# Patient Record
Sex: FEMALE | Race: BLACK OR AFRICAN AMERICAN | NOT HISPANIC OR LATINO | URBAN - METROPOLITAN AREA
[De-identification: names, ages, dates, MRNs, and addresses within clinical notes are randomized per-mention and may not be internally consistent; named-entity substitution may affect disease eponyms.]

---

## 2017-07-20 ENCOUNTER — INPATIENT (INPATIENT)
Facility: HOSPITAL | Age: 22
LOS: 2 days | Discharge: HOME | End: 2017-07-23
Attending: OBSTETRICS & GYNECOLOGY | Admitting: OBSTETRICS & GYNECOLOGY
Payer: COMMERCIAL

## 2017-07-20 VITALS — WEIGHT: 198.42 LBS | HEIGHT: 67 IN

## 2017-07-20 DIAGNOSIS — O26.893 OTHER SPECIFIED PREGNANCY RELATED CONDITIONS, THIRD TRIMESTER: ICD-10-CM

## 2017-07-20 LAB
AMPHET UR-MCNC: NEGATIVE — SIGNIFICANT CHANGE UP
APPEARANCE UR: ABNORMAL
BACTERIA # UR AUTO: ABNORMAL
BARBITURATES UR SCN-MCNC: NEGATIVE — SIGNIFICANT CHANGE UP
BASOPHILS # BLD AUTO: 0 K/UL — SIGNIFICANT CHANGE UP (ref 0–0.2)
BASOPHILS NFR BLD AUTO: 0.1 % — SIGNIFICANT CHANGE UP (ref 0–2)
BENZODIAZ UR-MCNC: NEGATIVE — SIGNIFICANT CHANGE UP
BILIRUB UR-MCNC: NEGATIVE — SIGNIFICANT CHANGE UP
BLD GP AB SCN SERPL QL: SIGNIFICANT CHANGE UP
COCAINE METAB.OTHER UR-MCNC: NEGATIVE — SIGNIFICANT CHANGE UP
COD CRY URNS QL: ABNORMAL
COLOR SPEC: YELLOW — SIGNIFICANT CHANGE UP
COMMENT - URINE: SIGNIFICANT CHANGE UP
DIFF PNL FLD: NEGATIVE — SIGNIFICANT CHANGE UP
EOSINOPHIL # BLD AUTO: 0.1 K/UL — SIGNIFICANT CHANGE UP (ref 0–0.5)
EOSINOPHIL NFR BLD AUTO: 1.8 % — SIGNIFICANT CHANGE UP (ref 0–6)
EPI CELLS # UR: SIGNIFICANT CHANGE UP
GLUCOSE UR QL: NEGATIVE MG/DL — SIGNIFICANT CHANGE UP
HCT VFR BLD CALC: 34.1 % — LOW (ref 37–47)
HGB BLD-MCNC: 10.7 G/DL — LOW (ref 12–16)
KETONES UR-MCNC: NEGATIVE — SIGNIFICANT CHANGE UP
LEUKOCYTE ESTERASE UR-ACNC: NEGATIVE — SIGNIFICANT CHANGE UP
LYMPHOCYTES # BLD AUTO: 1.9 K/UL — SIGNIFICANT CHANGE UP (ref 1–4.8)
LYMPHOCYTES # BLD AUTO: 24.3 % — SIGNIFICANT CHANGE UP (ref 20–55)
MCHC RBC-ENTMCNC: 26 PG — LOW (ref 27–31)
MCHC RBC-ENTMCNC: 31.4 G/DL — LOW (ref 32–36)
MCV RBC AUTO: 82.8 FL — SIGNIFICANT CHANGE UP (ref 81–99)
METHADONE UR-MCNC: NEGATIVE — SIGNIFICANT CHANGE UP
MONOCYTES # BLD AUTO: 0.9 K/UL — HIGH (ref 0–0.8)
MONOCYTES NFR BLD AUTO: 11.1 % — HIGH (ref 3–10)
NEUTROPHILS # BLD AUTO: 4.8 K/UL — SIGNIFICANT CHANGE UP (ref 1.8–8)
NEUTROPHILS NFR BLD AUTO: 61.9 % — SIGNIFICANT CHANGE UP (ref 37–73)
NITRITE UR-MCNC: NEGATIVE — SIGNIFICANT CHANGE UP
OPIATES UR-MCNC: NEGATIVE — SIGNIFICANT CHANGE UP
PCP SPEC-MCNC: SIGNIFICANT CHANGE UP
PCP UR-MCNC: NEGATIVE — SIGNIFICANT CHANGE UP
PH UR: 7 — SIGNIFICANT CHANGE UP (ref 5–8)
PLATELET # BLD AUTO: 172 K/UL — SIGNIFICANT CHANGE UP (ref 150–400)
PROT UR-MCNC: NEGATIVE MG/DL — SIGNIFICANT CHANGE UP
RBC # BLD: 4.12 M/UL — LOW (ref 4.4–5.2)
RBC # FLD: 16.7 % — HIGH (ref 11–15.6)
RBC CASTS # UR COMP ASSIST: SIGNIFICANT CHANGE UP /HPF (ref 0–4)
SP GR SPEC: 1.01 — SIGNIFICANT CHANGE UP (ref 1.01–1.02)
THC UR QL: NEGATIVE — SIGNIFICANT CHANGE UP
TYPE + AB SCN PNL BLD: SIGNIFICANT CHANGE UP
UROBILINOGEN FLD QL: NEGATIVE MG/DL — SIGNIFICANT CHANGE UP
WBC # BLD: 7.8 K/UL — SIGNIFICANT CHANGE UP (ref 4.8–10.8)
WBC # FLD AUTO: 7.8 K/UL — SIGNIFICANT CHANGE UP (ref 4.8–10.8)
WBC UR QL: SIGNIFICANT CHANGE UP

## 2017-07-20 PROCEDURE — 76815 OB US LIMITED FETUS(S): CPT | Mod: 26

## 2017-07-20 PROCEDURE — 76818 FETAL BIOPHYS PROFILE W/NST: CPT | Mod: 26

## 2017-07-20 RX ORDER — SODIUM CHLORIDE 9 MG/ML
1000 INJECTION, SOLUTION INTRAVENOUS ONCE
Qty: 0 | Refills: 0 | Status: COMPLETED | OUTPATIENT
Start: 2017-07-20 | End: 2017-07-20

## 2017-07-20 RX ORDER — SODIUM CHLORIDE 9 MG/ML
1000 INJECTION, SOLUTION INTRAVENOUS
Qty: 0 | Refills: 0 | Status: DISCONTINUED | OUTPATIENT
Start: 2017-07-20 | End: 2017-07-21

## 2017-07-20 RX ORDER — OXYTOCIN 10 UNIT/ML
333.33 VIAL (ML) INJECTION
Qty: 20 | Refills: 0 | Status: COMPLETED | OUTPATIENT
Start: 2017-07-20

## 2017-07-20 RX ORDER — CITRIC ACID/SODIUM CITRATE 300-500 MG
30 SOLUTION, ORAL ORAL ONCE
Qty: 0 | Refills: 0 | Status: COMPLETED | OUTPATIENT
Start: 2017-07-20 | End: 2017-07-21

## 2017-07-20 RX ADMIN — SODIUM CHLORIDE 2000 MILLILITER(S): 9 INJECTION, SOLUTION INTRAVENOUS at 15:25

## 2017-07-20 RX ADMIN — SODIUM CHLORIDE 125 MILLILITER(S): 9 INJECTION, SOLUTION INTRAVENOUS at 15:25

## 2017-07-20 NOTE — PATIENT PROFILE OB - REFERRALS COMMENT, PROFILE
Patient has been seen post delivery by social work, patient is currently under investigation by CPS for neglect and has an open CPS case.  Social work is notifying CPS that patient has delivered.

## 2017-07-21 LAB
GAS PNL BLDCOV: 7.36 — SIGNIFICANT CHANGE UP (ref 7.11–7.36)
PCO2 BLDCOA: 50 MMHG — SIGNIFICANT CHANGE UP (ref 32.2–65.8)
PCO2 BLDCOV: 41.2 MMHG — SIGNIFICANT CHANGE UP (ref 27–49.4)
PH BLDCOA: 7.28 — SIGNIFICANT CHANGE UP (ref 7.11–7.36)
PO2 BLDCOA: 22.5 MMHG — SIGNIFICANT CHANGE UP (ref 6–30)
PO2 BLDCOA: 27.8 MMHG — SIGNIFICANT CHANGE UP (ref 17.4–41)
RPR SERPL-ACNC: SIGNIFICANT CHANGE UP
SAO2 % BLDCOA: SIGNIFICANT CHANGE UP
SAO2 % BLDCOV: SIGNIFICANT CHANGE UP

## 2017-07-21 RX ORDER — TETANUS TOXOID, REDUCED DIPHTHERIA TOXOID AND ACELLULAR PERTUSSIS VACCINE, ADSORBED 5; 2.5; 8; 8; 2.5 [IU]/.5ML; [IU]/.5ML; UG/.5ML; UG/.5ML; UG/.5ML
0.5 SUSPENSION INTRAMUSCULAR ONCE
Qty: 0 | Refills: 0 | Status: DISCONTINUED | OUTPATIENT
Start: 2017-07-21 | End: 2017-07-23

## 2017-07-21 RX ORDER — HYDROCORTISONE 1 %
1 OINTMENT (GRAM) TOPICAL EVERY 4 HOURS
Qty: 0 | Refills: 0 | Status: DISCONTINUED | OUTPATIENT
Start: 2017-07-21 | End: 2017-07-23

## 2017-07-21 RX ORDER — SODIUM CHLORIDE 9 MG/ML
3 INJECTION INTRAMUSCULAR; INTRAVENOUS; SUBCUTANEOUS EVERY 8 HOURS
Qty: 0 | Refills: 0 | Status: DISCONTINUED | OUTPATIENT
Start: 2017-07-21 | End: 2017-07-23

## 2017-07-21 RX ORDER — ACETAMINOPHEN 500 MG
650 TABLET ORAL EVERY 6 HOURS
Qty: 0 | Refills: 0 | Status: DISCONTINUED | OUTPATIENT
Start: 2017-07-21 | End: 2017-07-23

## 2017-07-21 RX ORDER — DOCUSATE SODIUM 100 MG
100 CAPSULE ORAL
Qty: 0 | Refills: 0 | Status: DISCONTINUED | OUTPATIENT
Start: 2017-07-21 | End: 2017-07-23

## 2017-07-21 RX ORDER — DIBUCAINE 1 %
1 OINTMENT (GRAM) RECTAL EVERY 4 HOURS
Qty: 0 | Refills: 0 | Status: DISCONTINUED | OUTPATIENT
Start: 2017-07-21 | End: 2017-07-23

## 2017-07-21 RX ORDER — OXYTOCIN 10 UNIT/ML
41.67 VIAL (ML) INJECTION
Qty: 20 | Refills: 0 | Status: DISCONTINUED | OUTPATIENT
Start: 2017-07-21 | End: 2017-07-23

## 2017-07-21 RX ORDER — OXYTOCIN 10 UNIT/ML
333.33 VIAL (ML) INJECTION
Qty: 20 | Refills: 0 | Status: DISCONTINUED | OUTPATIENT
Start: 2017-07-21 | End: 2017-07-23

## 2017-07-21 RX ORDER — IBUPROFEN 200 MG
600 TABLET ORAL EVERY 6 HOURS
Qty: 0 | Refills: 0 | Status: DISCONTINUED | OUTPATIENT
Start: 2017-07-21 | End: 2017-07-23

## 2017-07-21 RX ORDER — LANOLIN
1 OINTMENT (GRAM) TOPICAL EVERY 6 HOURS
Qty: 0 | Refills: 0 | Status: DISCONTINUED | OUTPATIENT
Start: 2017-07-21 | End: 2017-07-23

## 2017-07-21 RX ORDER — AER TRAVELER 0.5 G/1
1 SOLUTION RECTAL; TOPICAL EVERY 4 HOURS
Qty: 0 | Refills: 0 | Status: DISCONTINUED | OUTPATIENT
Start: 2017-07-21 | End: 2017-07-23

## 2017-07-21 RX ORDER — SIMETHICONE 80 MG/1
80 TABLET, CHEWABLE ORAL EVERY 6 HOURS
Qty: 0 | Refills: 0 | Status: DISCONTINUED | OUTPATIENT
Start: 2017-07-21 | End: 2017-07-23

## 2017-07-21 RX ORDER — GLYCERIN ADULT
1 SUPPOSITORY, RECTAL RECTAL AT BEDTIME
Qty: 0 | Refills: 0 | Status: DISCONTINUED | OUTPATIENT
Start: 2017-07-21 | End: 2017-07-23

## 2017-07-21 RX ORDER — PRAMOXINE HYDROCHLORIDE 150 MG/15G
1 AEROSOL, FOAM RECTAL EVERY 4 HOURS
Qty: 0 | Refills: 0 | Status: DISCONTINUED | OUTPATIENT
Start: 2017-07-21 | End: 2017-07-23

## 2017-07-21 RX ORDER — MAGNESIUM HYDROXIDE 400 MG/1
30 TABLET, CHEWABLE ORAL
Qty: 0 | Refills: 0 | Status: DISCONTINUED | OUTPATIENT
Start: 2017-07-21 | End: 2017-07-23

## 2017-07-21 RX ORDER — OXYCODONE AND ACETAMINOPHEN 5; 325 MG/1; MG/1
2 TABLET ORAL EVERY 6 HOURS
Qty: 0 | Refills: 0 | Status: DISCONTINUED | OUTPATIENT
Start: 2017-07-21 | End: 2017-07-23

## 2017-07-21 RX ORDER — DIPHENHYDRAMINE HCL 50 MG
25 CAPSULE ORAL EVERY 6 HOURS
Qty: 0 | Refills: 0 | Status: DISCONTINUED | OUTPATIENT
Start: 2017-07-21 | End: 2017-07-23

## 2017-07-21 RX ADMIN — Medication 1000 MILLIUNIT(S)/MIN: at 09:52

## 2017-07-21 RX ADMIN — Medication 30 MILLILITER(S): at 06:30

## 2017-07-21 RX ADMIN — SODIUM CHLORIDE 125 MILLILITER(S): 9 INJECTION, SOLUTION INTRAVENOUS at 06:29

## 2017-07-22 RX ADMIN — Medication 1 TABLET(S): at 19:12

## 2017-07-22 RX ADMIN — Medication 600 MILLIGRAM(S): at 01:39

## 2017-07-22 RX ADMIN — Medication 600 MILLIGRAM(S): at 02:29

## 2017-07-22 NOTE — PROGRESS NOTE ADULT - ASSESSMENT
21y years old.  status post spontaneous vaginal delivery at 41 weeks gestation. Postpartum day # 1 without complications

## 2017-07-22 NOTE — PROGRESS NOTE ADULT - SUBJECTIVE AND OBJECTIVE BOX
Postpartum progress note.  21y years old.  status post spontaneous vaginal delivery at 41 weeks gestation. Postpartum day # 1    Patient seen and examined at bedside, no acute overnight events.    Subjective: Patient is ambulating, tolerating PO intake of regular diet, voiding, passing flatus, ____ bowel movements. No breast tenderness. _______ feeding. Pain is well controlled. She reports ________ vaginal bleeding, has changed pads ___ times during the night _______ soaked. Patient denies headache, nausea/vomiting, shortness of breath, fever, chills or calf pain.     Objective:   Vital Signs: Vital Signs Last 24 Hrs  T(C): 36.7 (2017 21:09), Max: 37 (2017 13:56)  T(F): 98 (2017 21:09), Max: 98.6 (2017 13:56)  HR: 69 (2017 21:09) (54 - 69)  BP: 118/76 (2017 21:09) (99/46 - 129/60)  BP(mean): --  RR: 18 (2017 21:09) (16 - 20)  SpO2: --    Physical Examination:  General: No acute distress.  Lungs: Clear to auscultation bilaterally, no adventitious sounds.  Cardiovascular: Regular rate and rhythm, normal S1/S2, no murmurs.  Abdomen: Bowel sounds present, soft, non distended, minimally tender, fundus firm at ____ cm below umbilicus.  Pelvic: Minimal lochia rubra.  Extremities: No edema. No calf tenderness, (-) Dennis's sign.    Labs:                        10.7   7.8   )-----------( 172      ( 2017 15:41 )             34.1   Urinalysis Basic - ( 2017 15:45 )    Color: Yellow / Appearance: x / S.015 / pH: x  Gluc: x / Ketone: Negative  / Bili: Negative / Urobili: Negative mg/dL   Blood: x / Protein: Negative mg/dL / Nitrite: Negative   Leuk Esterase: Negative / RBC: 0-2 /HPF / WBC 0-2   Sq Epi: x / Non Sq Epi: Few / Bacteria: Few        Medication:  MEDICATIONS  (STANDING):  sodium chloride 0.9% lock flush 3 milliLiter(s) IV Push every 8 hours  diphtheria/tetanus/pertussis (acellular) Vaccine (ADAcel) 0.5 milliLiter(s) IntraMuscular once  oxytocin Infusion 41.667 milliUNIT(s)/Min (125 mL/Hr) IV Continuous <Continuous>  prenatal multivitamin 1 Tablet(s) Oral daily  oxytocin Infusion 333.333 milliUNIT(s)/Min (1000 mL/Hr) IV Continuous <Continuous>    MEDICATIONS  (PRN):  acetaminophen   Tablet. 650 milliGRAM(s) Oral every 6 hours PRN Mild Pain  acetaminophen   Tablet 650 milliGRAM(s) Oral every 6 hours PRN For Temp greater than 38.5 C (101.3 F)  ibuprofen  Tablet 600 milliGRAM(s) Oral every 6 hours PRN Moderate Pain  oxyCODONE    5 mG/acetaminophen 325 mG 2 Tablet(s) Oral every 6 hours PRN Severe Pain  hydrocortisone 1% Cream 1 Application(s) Topical every 4 hours PRN Moderate to Severe Perineal Pain  pramoxine 1%/zinc 5% Cream 1 Application(s) Topical every 4 hours PRN Moderate to Severe Perineal Pain  dibucaine 1% Ointment 1 Application(s) Topical every 4 hours PRN Perineal Discomfort  lanolin Ointment 1 Application(s) Topical every 6 hours PRN Sore Nipples  witch hazel Pads 1 Application(s) Topical every 4 hours PRN Perineal Discomfort  simethicone 80 milliGRAM(s) Chew every 6 hours PRN Gas  diphenhydrAMINE   Capsule 25 milliGRAM(s) Oral every 6 hours PRN Itching  glycerin Suppository - Adult 1 Suppository(s) Rectal at bedtime PRN Constipation  magnesium hydroxide Suspension 30 milliLiter(s) Oral two times a day PRN Constipation  docusate sodium 100 milliGRAM(s) Oral two times a day PRN Stool Softening Postpartum progress note.  21y years old.  status post spontaneous vaginal delivery at 41 weeks gestation. Postpartum day # 1    Patient seen and examined at bedside, no acute overnight events.    Subjective: Patient is ambulating, tolerating PO intake of regular diet, voiding, passing flatus, and defecating. No breast tenderness. Breast feeding without difficulty. Pain is well controlled. She reports minimal vaginal bleeding, has changed pads 3 times during the night partially soaked. Patient denies headache, nausea/vomiting, shortness of breath, fever, chills or calf pain.     Objective:   Vital Signs: Vital Signs Last 24 Hrs  T(C): 36.7 (2017 21:09), Max: 37 (2017 13:56)  T(F): 98 (2017 21:09), Max: 98.6 (2017 13:56)  HR: 69 (2017 21:09) (54 - 69)  BP: 118/76 (2017 21:09) (99/46 - 129/60)  BP(mean): --  RR: 18 (2017 21:09) (16 - 20)  SpO2: --    Physical Examination:  General: No acute distress.  Lungs: Clear to auscultation bilaterally, no adventitious sounds.  Cardiovascular: Regular rate and rhythm, normal S1/S2, no murmurs.  Abdomen: Bowel sounds present, soft, non distended, minimally tender, fundus firm at level of umbilicus.  Pelvic: Minimal lochia rubra.  Extremities: No edema. No calf tenderness, (-) Dennis's sign.    Labs:                        10.7   7.8   )-----------( 172      ( 2017 15:41 )             34.1   Urinalysis Basic - ( 2017 15:45 )    Color: Yellow / Appearance: x / S.015 / pH: x  Gluc: x / Ketone: Negative  / Bili: Negative / Urobili: Negative mg/dL   Blood: x / Protein: Negative mg/dL / Nitrite: Negative   Leuk Esterase: Negative / RBC: 0-2 /HPF / WBC 0-2   Sq Epi: x / Non Sq Epi: Few / Bacteria: Few        Medication:  MEDICATIONS  (STANDING):  sodium chloride 0.9% lock flush 3 milliLiter(s) IV Push every 8 hours  diphtheria/tetanus/pertussis (acellular) Vaccine (ADAcel) 0.5 milliLiter(s) IntraMuscular once  oxytocin Infusion 41.667 milliUNIT(s)/Min (125 mL/Hr) IV Continuous <Continuous>  prenatal multivitamin 1 Tablet(s) Oral daily  oxytocin Infusion 333.333 milliUNIT(s)/Min (1000 mL/Hr) IV Continuous <Continuous>    MEDICATIONS  (PRN):  acetaminophen   Tablet. 650 milliGRAM(s) Oral every 6 hours PRN Mild Pain  acetaminophen   Tablet 650 milliGRAM(s) Oral every 6 hours PRN For Temp greater than 38.5 C (101.3 F)  ibuprofen  Tablet 600 milliGRAM(s) Oral every 6 hours PRN Moderate Pain  oxyCODONE    5 mG/acetaminophen 325 mG 2 Tablet(s) Oral every 6 hours PRN Severe Pain  hydrocortisone 1% Cream 1 Application(s) Topical every 4 hours PRN Moderate to Severe Perineal Pain  pramoxine 1%/zinc 5% Cream 1 Application(s) Topical every 4 hours PRN Moderate to Severe Perineal Pain  dibucaine 1% Ointment 1 Application(s) Topical every 4 hours PRN Perineal Discomfort  lanolin Ointment 1 Application(s) Topical every 6 hours PRN Sore Nipples  witch hazel Pads 1 Application(s) Topical every 4 hours PRN Perineal Discomfort  simethicone 80 milliGRAM(s) Chew every 6 hours PRN Gas  diphenhydrAMINE   Capsule 25 milliGRAM(s) Oral every 6 hours PRN Itching  glycerin Suppository - Adult 1 Suppository(s) Rectal at bedtime PRN Constipation  magnesium hydroxide Suspension 30 milliLiter(s) Oral two times a day PRN Constipation  docusate sodium 100 milliGRAM(s) Oral two times a day PRN Stool Softening

## 2017-07-22 NOTE — PROGRESS NOTE ADULT - SUBJECTIVE AND OBJECTIVE BOX
INTERVAL HPI/OVERNIGHT EVENTS:  21y Female s/p labor epidural on 7/21/17    Vital Signs Last 24 Hrs  T(C): 36.5 (22 Jul 2017 08:25), Max: 37 (21 Jul 2017 13:56)  T(F): 97.7 (22 Jul 2017 08:25), Max: 98.6 (21 Jul 2017 13:56)  HR: 57 (22 Jul 2017 08:25) (57 - 69)  BP: 110/63 (22 Jul 2017 08:25) (110/63 - 120/58)  BP(mean): --  RR: 18 (22 Jul 2017 08:25) (16 - 20)  SpO2: --    Patient seen, doing well, no headache, no residual numbness or weakness, no anesthetic complications or complaints noted or reported.

## 2017-07-23 VITALS
HEART RATE: 56 BPM | TEMPERATURE: 98 F | DIASTOLIC BLOOD PRESSURE: 67 MMHG | RESPIRATION RATE: 18 BRPM | SYSTOLIC BLOOD PRESSURE: 109 MMHG

## 2017-07-23 PROCEDURE — 86592 SYPHILIS TEST NON-TREP QUAL: CPT

## 2017-07-23 PROCEDURE — 86900 BLOOD TYPING SEROLOGIC ABO: CPT

## 2017-07-23 PROCEDURE — 59025 FETAL NON-STRESS TEST: CPT

## 2017-07-23 PROCEDURE — 81001 URINALYSIS AUTO W/SCOPE: CPT

## 2017-07-23 PROCEDURE — 86850 RBC ANTIBODY SCREEN: CPT

## 2017-07-23 PROCEDURE — 82803 BLOOD GASES ANY COMBINATION: CPT

## 2017-07-23 PROCEDURE — 86901 BLOOD TYPING SEROLOGIC RH(D): CPT

## 2017-07-23 PROCEDURE — G0463: CPT

## 2017-07-23 PROCEDURE — 36415 COLL VENOUS BLD VENIPUNCTURE: CPT

## 2017-07-23 PROCEDURE — 76818 FETAL BIOPHYS PROFILE W/NST: CPT

## 2017-07-23 PROCEDURE — 80307 DRUG TEST PRSMV CHEM ANLYZR: CPT

## 2017-07-23 PROCEDURE — 76815 OB US LIMITED FETUS(S): CPT

## 2017-07-23 PROCEDURE — 85027 COMPLETE CBC AUTOMATED: CPT

## 2017-07-23 RX ORDER — DOCUSATE SODIUM 100 MG
1 CAPSULE ORAL
Qty: 60 | Refills: 0 | OUTPATIENT
Start: 2017-07-23 | End: 2017-08-22

## 2017-07-23 RX ORDER — FERROUS SULFATE 325(65) MG
1 TABLET ORAL
Qty: 30 | Refills: 0 | OUTPATIENT
Start: 2017-07-23 | End: 2017-08-22

## 2017-07-23 RX ORDER — IBUPROFEN 200 MG
1 TABLET ORAL
Qty: 120 | Refills: 0 | OUTPATIENT
Start: 2017-07-23 | End: 2017-08-22

## 2017-07-23 NOTE — DISCHARGE NOTE OB - HOSPITAL COURSE
Pt is 21y years old  status post spontaneous vaginal delivery at 41 weeks gestation. Postpartum day # 2.  (normal spontaneous vaginal delivery). resolved. D/c pt home. Recommended early ambulation, adequate hydration and a balanced diet. Mother-baby interaction also encouraged and breast feeding ad lilian. Pelvic rest × 6 weeks. Postpartum check in 4–6 weeks with OB/GYN for post partum.

## 2017-07-23 NOTE — DISCHARGE NOTE OB - CARE PROVIDER_API CALL
[facility],   [address],   [place] [address]  Ph: [phone]  Phone: (   )    -  Fax: (   )    -

## 2017-07-23 NOTE — DISCHARGE NOTE OB - PROVIDER TOKENS
FREE:[LAST:[HR],PHONE:[(   )    -],FAX:[(   )    -],ADDRESS:[[facility],   [place] [address]  Ph: [phone]]]

## 2017-07-23 NOTE — DISCHARGE NOTE OB - CARE PLAN
Principal Discharge DX:	Normal vaginal delivery  Goal:	Reached  Instructions for follow-up, activity and diet:	F/u at [facility] clinic in 6 weeks for post partum care. Principal Discharge DX:	Normal vaginal delivery  Goal:	Reached  Instructions for follow-up, activity and diet:	F/u at [facility] clinic in 6 weeks for post partum care. Principal Discharge DX:	Normal vaginal delivery  Goal:	Reached  Instructions for follow-up, activity and diet:	F/u at [facility] clinic in 6 weeks for post partum care.

## 2017-07-23 NOTE — PROGRESS NOTE ADULT - SUBJECTIVE AND OBJECTIVE BOX
Pt is 21y years old  status post spontaneous vaginal delivery at 41 weeks gestation. Postpartum day # 2. Patient seen and examined at bedside, no acute overnight events.   Patient is ambulating, +eating, +PO hydration, +voiding, +Flatus, -BM, +Breast feeding and pain is well controlled.  ROS- Denies headache, SOB, fever, chills and calf pain.    VS:   Vital Signs Last 24 Hrs  T(C): 36.8 (2017 20:30), Max: 36.8 (2017 20:30)  T(F): 98.3 (2017 20:30), Max: 98.3 (2017 20:30)  HR: 59 (2017 20:30) (57 - 59)  BP: 117/70 (2017 20:30) (110/63 - 117/70)  RR: 18 (2017 20:30) (18 - 18)    Physical Exam:  General: NAD  CVS: RRR, +S1/S2  Lungs: CTAB, no wheeze, rhonchi or rales.   Abdomen: +BS, soft, ND, minimally tender, Fundus firm at level of umbilicus.   Pelvic: Minimal lochia  Ext: No cyanosis, edema or calf tenderness.     Labs:        Medication:  MEDICATIONS  (STANDING):  sodium chloride 0.9% lock flush 3 milliLiter(s) IV Push every 8 hours  diphtheria/tetanus/pertussis (acellular) Vaccine (ADAcel) 0.5 milliLiter(s) IntraMuscular once  oxytocin Infusion 41.667 milliUNIT(s)/Min (125 mL/Hr) IV Continuous <Continuous>  prenatal multivitamin 1 Tablet(s) Oral daily  oxytocin Infusion 333.333 milliUNIT(s)/Min (1000 mL/Hr) IV Continuous <Continuous>    MEDICATIONS  (PRN):  acetaminophen   Tablet. 650 milliGRAM(s) Oral every 6 hours PRN Mild Pain  acetaminophen   Tablet 650 milliGRAM(s) Oral every 6 hours PRN For Temp greater than 38.5 C (101.3 F)  ibuprofen  Tablet 600 milliGRAM(s) Oral every 6 hours PRN Moderate Pain  oxyCODONE    5 mG/acetaminophen 325 mG 2 Tablet(s) Oral every 6 hours PRN Severe Pain  hydrocortisone 1% Cream 1 Application(s) Topical every 4 hours PRN Moderate to Severe Perineal Pain  pramoxine 1%/zinc 5% Cream 1 Application(s) Topical every 4 hours PRN Moderate to Severe Perineal Pain  dibucaine 1% Ointment 1 Application(s) Topical every 4 hours PRN Perineal Discomfort  lanolin Ointment 1 Application(s) Topical every 6 hours PRN Sore Nipples  witch hazel Pads 1 Application(s) Topical every 4 hours PRN Perineal Discomfort  simethicone 80 milliGRAM(s) Chew every 6 hours PRN Gas  diphenhydrAMINE   Capsule 25 milliGRAM(s) Oral every 6 hours PRN Itching  glycerin Suppository - Adult 1 Suppository(s) Rectal at bedtime PRN Constipation  magnesium hydroxide Suspension 30 milliLiter(s) Oral two times a day PRN Constipation  docusate sodium 100 milliGRAM(s) Oral two times a day PRN Stool Softening

## 2017-07-23 NOTE — PROGRESS NOTE ADULT - PROBLEM SELECTOR PLAN 1
Stable   Continue current management  Encourage breast feeding, ambulation  Plan to d/c today on PPD #2

## 2017-07-23 NOTE — PROGRESS NOTE ADULT - ASSESSMENT
Pt is 21y years old  status post spontaneous vaginal delivery at 41 weeks gestation. Postpartum day # 2

## 2017-07-23 NOTE — DISCHARGE NOTE OB - MATERIALS PROVIDED
[facility] [place] Screening Program/[place]  Immunization Record/Breastfeeding Mother’s Support Group Information/Guide to Postpartum Care/[facility] Hearing Screen Program/Breastfeeding Log/Discharge Medication Information for Patients and Families Pocket Guide/Vaccinations/Breastfeeding Guide and Packet/Birth Certificate Instructions/Back To Sleep Handout/Shaken Baby Prevention Handout

## 2018-07-08 ENCOUNTER — EMERGENCY (EMERGENCY)
Facility: HOSPITAL | Age: 23
LOS: 1 days | Discharge: ANOTHER HOSPITAL | End: 2018-07-08
Attending: EMERGENCY MEDICINE
Payer: MEDICAID

## 2018-07-08 ENCOUNTER — TRANSCRIPTION ENCOUNTER (OUTPATIENT)
Age: 23
End: 2018-07-08

## 2018-07-08 ENCOUNTER — RESULT REVIEW (OUTPATIENT)
Age: 23
End: 2018-07-08

## 2018-07-08 ENCOUNTER — INPATIENT (INPATIENT)
Facility: HOSPITAL | Age: 23
LOS: 1 days | Discharge: HOME | End: 2018-07-10
Attending: STUDENT IN AN ORGANIZED HEALTH CARE EDUCATION/TRAINING PROGRAM | Admitting: STUDENT IN AN ORGANIZED HEALTH CARE EDUCATION/TRAINING PROGRAM
Payer: COMMERCIAL

## 2018-07-08 VITALS
DIASTOLIC BLOOD PRESSURE: 56 MMHG | HEART RATE: 74 BPM | SYSTOLIC BLOOD PRESSURE: 114 MMHG | TEMPERATURE: 99 F | RESPIRATION RATE: 14 BRPM

## 2018-07-08 VITALS
WEIGHT: 177.91 LBS | HEART RATE: 74 BPM | RESPIRATION RATE: 18 BRPM | DIASTOLIC BLOOD PRESSURE: 83 MMHG | TEMPERATURE: 98 F | HEIGHT: 66 IN | SYSTOLIC BLOOD PRESSURE: 121 MMHG | OXYGEN SATURATION: 100 %

## 2018-07-08 VITALS
SYSTOLIC BLOOD PRESSURE: 123 MMHG | DIASTOLIC BLOOD PRESSURE: 78 MMHG | RESPIRATION RATE: 17 BRPM | OXYGEN SATURATION: 100 % | TEMPERATURE: 98 F | HEART RATE: 76 BPM

## 2018-07-08 DIAGNOSIS — O47.02 FALSE LABOR BEFORE 37 COMPLETED WEEKS OF GESTATION, SECOND TRIMESTER: ICD-10-CM

## 2018-07-08 LAB
ALBUMIN SERPL ELPH-MCNC: 2.5 G/DL — LOW (ref 3.3–5)
ALP SERPL-CCNC: 106 U/L — SIGNIFICANT CHANGE UP (ref 40–120)
ALT FLD-CCNC: 16 U/L — SIGNIFICANT CHANGE UP (ref 12–78)
AMPHET UR-MCNC: NEGATIVE — SIGNIFICANT CHANGE UP
ANION GAP SERPL CALC-SCNC: 8 MMOL/L — SIGNIFICANT CHANGE UP (ref 5–17)
APPEARANCE UR: ABNORMAL
APTT BLD: 25.4 SEC — LOW (ref 27.5–37.4)
APTT BLD: 26.7 SEC — LOW (ref 27.5–37.4)
AST SERPL-CCNC: 18 U/L — SIGNIFICANT CHANGE UP (ref 15–37)
BARBITURATES UR SCN-MCNC: NEGATIVE — SIGNIFICANT CHANGE UP
BASE EXCESS BLDCOA CALC-SCNC: -5.1 MMOL/L — LOW (ref -2–2)
BASE EXCESS BLDCOV CALC-SCNC: -2.8 MMOL/L — LOW (ref -2–2)
BASOPHILS # BLD AUTO: 0 K/UL — SIGNIFICANT CHANGE UP (ref 0–0.2)
BASOPHILS # BLD AUTO: 0.01 K/UL — SIGNIFICANT CHANGE UP (ref 0–0.2)
BASOPHILS NFR BLD AUTO: 0.1 % — SIGNIFICANT CHANGE UP (ref 0–2)
BASOPHILS NFR BLD AUTO: 0.1 % — SIGNIFICANT CHANGE UP (ref 0–2)
BENZODIAZ UR-MCNC: NEGATIVE — SIGNIFICANT CHANGE UP
BILIRUB SERPL-MCNC: 0.3 MG/DL — SIGNIFICANT CHANGE UP (ref 0.2–1.2)
BILIRUB UR-MCNC: NEGATIVE — SIGNIFICANT CHANGE UP
BLD GP AB SCN SERPL QL: SIGNIFICANT CHANGE UP
BUN SERPL-MCNC: 4 MG/DL — LOW (ref 7–23)
CALCIUM SERPL-MCNC: 8.2 MG/DL — LOW (ref 8.5–10.1)
CHLORIDE SERPL-SCNC: 103 MMOL/L — SIGNIFICANT CHANGE UP (ref 96–108)
CO2 SERPL-SCNC: 23 MMOL/L — SIGNIFICANT CHANGE UP (ref 22–31)
COCAINE METAB.OTHER UR-MCNC: NEGATIVE — SIGNIFICANT CHANGE UP
COLOR SPEC: YELLOW — SIGNIFICANT CHANGE UP
CREAT SERPL-MCNC: 0.69 MG/DL — SIGNIFICANT CHANGE UP (ref 0.5–1.3)
DIFF PNL FLD: NEGATIVE — SIGNIFICANT CHANGE UP
EOSINOPHIL # BLD AUTO: 0 K/UL — SIGNIFICANT CHANGE UP (ref 0–0.5)
EOSINOPHIL # BLD AUTO: 0.04 K/UL — SIGNIFICANT CHANGE UP (ref 0–0.5)
EOSINOPHIL NFR BLD AUTO: 0.3 % — SIGNIFICANT CHANGE UP (ref 0–6)
EOSINOPHIL NFR BLD AUTO: 0.4 % — SIGNIFICANT CHANGE UP (ref 0–6)
FIBRONECTIN FETAL SPEC QL: POSITIVE
GAS PNL BLDCOV: 7.32 — SIGNIFICANT CHANGE UP (ref 7.25–7.45)
GLUCOSE SERPL-MCNC: 83 MG/DL — SIGNIFICANT CHANGE UP (ref 70–99)
GLUCOSE UR QL: NEGATIVE — SIGNIFICANT CHANGE UP
HCO3 BLDCOA-SCNC: 19 MMOL/L — LOW (ref 21–29)
HCO3 BLDCOV-SCNC: 21 MMOL/L — SIGNIFICANT CHANGE UP (ref 21–29)
HCT VFR BLD CALC: 26 % — LOW (ref 37–47)
HCT VFR BLD CALC: 27.1 % — LOW (ref 37–47)
HCT VFR BLD CALC: 27.9 % — LOW (ref 34.5–45)
HGB BLD-MCNC: 7.9 G/DL — LOW (ref 12–16)
HGB BLD-MCNC: 8 G/DL — LOW (ref 12–16)
HGB BLD-MCNC: 8.6 G/DL — LOW (ref 11.5–15.5)
HIV 1 & 2 AB SERPL IA.RAPID: SIGNIFICANT CHANGE UP
IMM GRANULOCYTES NFR BLD AUTO: 0.9 % — SIGNIFICANT CHANGE UP (ref 0–1.5)
INR BLD: 0.94 RATIO — SIGNIFICANT CHANGE UP (ref 0.88–1.16)
INR BLD: 0.99 RATIO — SIGNIFICANT CHANGE UP (ref 0.88–1.16)
KETONES UR-MCNC: NEGATIVE — SIGNIFICANT CHANGE UP
LEUKOCYTE ESTERASE UR-ACNC: ABNORMAL
LYMPHOCYTES # BLD AUTO: 1.4 K/UL — SIGNIFICANT CHANGE UP (ref 1–4.8)
LYMPHOCYTES # BLD AUTO: 13.8 % — LOW (ref 20–55)
LYMPHOCYTES # BLD AUTO: 2.3 K/UL — SIGNIFICANT CHANGE UP (ref 1–3.3)
LYMPHOCYTES # BLD AUTO: 25.1 % — SIGNIFICANT CHANGE UP (ref 13–44)
MAGNESIUM SERPL-MCNC: 2.3 MG/DL — SIGNIFICANT CHANGE UP (ref 1.6–2.6)
MCHC RBC-ENTMCNC: 21.6 PG — LOW (ref 27–31)
MCHC RBC-ENTMCNC: 21.8 PG — LOW (ref 27–31)
MCHC RBC-ENTMCNC: 22.5 PG — LOW (ref 27–34)
MCHC RBC-ENTMCNC: 29.5 G/DL — LOW (ref 32–36)
MCHC RBC-ENTMCNC: 30.4 G/DL — LOW (ref 32–36)
MCHC RBC-ENTMCNC: 30.8 GM/DL — LOW (ref 32–36)
MCV RBC AUTO: 71.8 FL — LOW (ref 81–99)
MCV RBC AUTO: 72.8 FL — LOW (ref 80–100)
MCV RBC AUTO: 73.2 FL — LOW (ref 81–99)
METHADONE UR-MCNC: NEGATIVE — SIGNIFICANT CHANGE UP
MONOCYTES # BLD AUTO: 0.6 K/UL — SIGNIFICANT CHANGE UP (ref 0–0.8)
MONOCYTES # BLD AUTO: 0.67 K/UL — SIGNIFICANT CHANGE UP (ref 0–0.9)
MONOCYTES NFR BLD AUTO: 5.9 % — SIGNIFICANT CHANGE UP (ref 3–10)
MONOCYTES NFR BLD AUTO: 7.3 % — SIGNIFICANT CHANGE UP (ref 2–14)
NEUTROPHILS # BLD AUTO: 6.05 K/UL — SIGNIFICANT CHANGE UP (ref 1.8–7.4)
NEUTROPHILS # BLD AUTO: 8.4 K/UL — HIGH (ref 1.8–8)
NEUTROPHILS NFR BLD AUTO: 66.2 % — SIGNIFICANT CHANGE UP (ref 43–77)
NEUTROPHILS NFR BLD AUTO: 79.6 % — HIGH (ref 37–73)
NITRITE UR-MCNC: NEGATIVE — SIGNIFICANT CHANGE UP
OPIATES UR-MCNC: NEGATIVE — SIGNIFICANT CHANGE UP
PCO2 BLDCOA: 54.7 MMHG — SIGNIFICANT CHANGE UP (ref 32–68)
PCO2 BLDCOV: 45.3 MMHG — SIGNIFICANT CHANGE UP (ref 29–53)
PCP SPEC-MCNC: SIGNIFICANT CHANGE UP
PCP UR-MCNC: NEGATIVE — SIGNIFICANT CHANGE UP
PH BLDCOA: 7.23 — SIGNIFICANT CHANGE UP (ref 7.18–7.38)
PH UR: 8 — SIGNIFICANT CHANGE UP (ref 5–8)
PLATELET # BLD AUTO: 174 K/UL — SIGNIFICANT CHANGE UP (ref 150–400)
PLATELET # BLD AUTO: 192 K/UL — SIGNIFICANT CHANGE UP (ref 150–400)
PLATELET # BLD AUTO: 214 K/UL — SIGNIFICANT CHANGE UP (ref 150–400)
PO2 BLDCOA: 16.5 MMHG — SIGNIFICANT CHANGE UP (ref 5.7–30.5)
PO2 BLDCOA: 23.9 MMHG — SIGNIFICANT CHANGE UP (ref 17–41)
POTASSIUM SERPL-MCNC: 4 MMOL/L — SIGNIFICANT CHANGE UP (ref 3.5–5.3)
POTASSIUM SERPL-SCNC: 4 MMOL/L — SIGNIFICANT CHANGE UP (ref 3.5–5.3)
PROT SERPL-MCNC: 7.2 G/DL — SIGNIFICANT CHANGE UP (ref 6–8.3)
PROT UR-MCNC: NEGATIVE — SIGNIFICANT CHANGE UP
PROTHROM AB SERPL-ACNC: 10.3 SEC — SIGNIFICANT CHANGE UP (ref 9.8–12.7)
PROTHROM AB SERPL-ACNC: 10.8 SEC — SIGNIFICANT CHANGE UP (ref 9.8–12.7)
RBC # BLD: 3.62 M/UL — LOW (ref 4.4–5.2)
RBC # BLD: 3.7 M/UL — LOW (ref 4.4–5.2)
RBC # BLD: 3.83 M/UL — SIGNIFICANT CHANGE UP (ref 3.8–5.2)
RBC # FLD: 16.8 % — HIGH (ref 10.3–14.5)
RBC # FLD: 16.9 % — HIGH (ref 11–15.6)
RBC # FLD: 16.9 % — HIGH (ref 11–15.6)
SAO2 % BLDCOA: SIGNIFICANT CHANGE UP
SAO2 % BLDCOV: SIGNIFICANT CHANGE UP
SODIUM SERPL-SCNC: 134 MMOL/L — LOW (ref 135–145)
SP GR SPEC: 1.01 — SIGNIFICANT CHANGE UP (ref 1.01–1.02)
THC UR QL: NEGATIVE — SIGNIFICANT CHANGE UP
TYPE + AB SCN PNL BLD: SIGNIFICANT CHANGE UP
UROBILINOGEN FLD QL: NEGATIVE — SIGNIFICANT CHANGE UP
WBC # BLD: 10.5 K/UL — SIGNIFICANT CHANGE UP (ref 4.8–10.8)
WBC # BLD: 18.3 K/UL — HIGH (ref 4.8–10.8)
WBC # BLD: 9.15 K/UL — SIGNIFICANT CHANGE UP (ref 3.8–10.5)
WBC # FLD AUTO: 10.5 K/UL — SIGNIFICANT CHANGE UP (ref 4.8–10.8)
WBC # FLD AUTO: 18.3 K/UL — HIGH (ref 4.8–10.8)
WBC # FLD AUTO: 9.15 K/UL — SIGNIFICANT CHANGE UP (ref 3.8–10.5)

## 2018-07-08 PROCEDURE — 88307 TISSUE EXAM BY PATHOLOGIST: CPT | Mod: 26

## 2018-07-08 PROCEDURE — 80053 COMPREHEN METABOLIC PANEL: CPT

## 2018-07-08 PROCEDURE — 85730 THROMBOPLASTIN TIME PARTIAL: CPT

## 2018-07-08 PROCEDURE — 86901 BLOOD TYPING SEROLOGIC RH(D): CPT

## 2018-07-08 PROCEDURE — 99285 EMERGENCY DEPT VISIT HI MDM: CPT

## 2018-07-08 PROCEDURE — 76817 TRANSVAGINAL US OBSTETRIC: CPT | Mod: 26

## 2018-07-08 PROCEDURE — 86900 BLOOD TYPING SEROLOGIC ABO: CPT

## 2018-07-08 PROCEDURE — 81001 URINALYSIS AUTO W/SCOPE: CPT

## 2018-07-08 PROCEDURE — 86850 RBC ANTIBODY SCREEN: CPT

## 2018-07-08 PROCEDURE — 85610 PROTHROMBIN TIME: CPT

## 2018-07-08 PROCEDURE — 99284 EMERGENCY DEPT VISIT MOD MDM: CPT

## 2018-07-08 PROCEDURE — 76815 OB US LIMITED FETUS(S): CPT | Mod: 26

## 2018-07-08 PROCEDURE — 85027 COMPLETE CBC AUTOMATED: CPT

## 2018-07-08 RX ORDER — AZITHROMYCIN 500 MG/1
1000 TABLET, FILM COATED ORAL ONCE
Qty: 0 | Refills: 0 | Status: DISCONTINUED | OUTPATIENT
Start: 2018-07-08 | End: 2018-07-08

## 2018-07-08 RX ORDER — OXYTOCIN 10 UNIT/ML
41.67 VIAL (ML) INJECTION
Qty: 20 | Refills: 0 | Status: DISCONTINUED | OUTPATIENT
Start: 2018-07-08 | End: 2018-07-10

## 2018-07-08 RX ORDER — SODIUM CHLORIDE 9 MG/ML
3 INJECTION INTRAMUSCULAR; INTRAVENOUS; SUBCUTANEOUS EVERY 8 HOURS
Qty: 0 | Refills: 0 | Status: DISCONTINUED | OUTPATIENT
Start: 2018-07-08 | End: 2018-07-10

## 2018-07-08 RX ORDER — NITROFURANTOIN MACROCRYSTAL 50 MG
100 CAPSULE ORAL
Qty: 0 | Refills: 0 | Status: DISCONTINUED | OUTPATIENT
Start: 2018-07-08 | End: 2018-07-10

## 2018-07-08 RX ORDER — OXYCODONE AND ACETAMINOPHEN 5; 325 MG/1; MG/1
2 TABLET ORAL EVERY 6 HOURS
Qty: 0 | Refills: 0 | Status: DISCONTINUED | OUTPATIENT
Start: 2018-07-08 | End: 2018-07-10

## 2018-07-08 RX ORDER — SODIUM CHLORIDE 9 MG/ML
1000 INJECTION, SOLUTION INTRAVENOUS ONCE
Qty: 0 | Refills: 0 | Status: DISCONTINUED | OUTPATIENT
Start: 2018-07-08 | End: 2018-07-10

## 2018-07-08 RX ORDER — HYDROCORTISONE 1 %
1 OINTMENT (GRAM) TOPICAL EVERY 4 HOURS
Qty: 0 | Refills: 0 | Status: DISCONTINUED | OUTPATIENT
Start: 2018-07-08 | End: 2018-07-10

## 2018-07-08 RX ORDER — CITRIC ACID/SODIUM CITRATE 300-500 MG
30 SOLUTION, ORAL ORAL ONCE
Qty: 0 | Refills: 0 | Status: DISCONTINUED | OUTPATIENT
Start: 2018-07-08 | End: 2018-07-08

## 2018-07-08 RX ORDER — MAGNESIUM SULFATE 500 MG/ML
4 VIAL (ML) INJECTION ONCE
Qty: 0 | Refills: 0 | Status: COMPLETED | OUTPATIENT
Start: 2018-07-08 | End: 2018-07-08

## 2018-07-08 RX ORDER — MAGNESIUM SULFATE 500 MG/ML
2 VIAL (ML) INJECTION
Qty: 40 | Refills: 0 | Status: DISCONTINUED | OUTPATIENT
Start: 2018-07-08 | End: 2018-07-10

## 2018-07-08 RX ORDER — SIMETHICONE 80 MG/1
80 TABLET, CHEWABLE ORAL EVERY 6 HOURS
Qty: 0 | Refills: 0 | Status: DISCONTINUED | OUTPATIENT
Start: 2018-07-08 | End: 2018-07-10

## 2018-07-08 RX ORDER — PRAMOXINE HYDROCHLORIDE 150 MG/15G
1 AEROSOL, FOAM RECTAL EVERY 4 HOURS
Qty: 0 | Refills: 0 | Status: DISCONTINUED | OUTPATIENT
Start: 2018-07-08 | End: 2018-07-10

## 2018-07-08 RX ORDER — SODIUM CHLORIDE 9 MG/ML
1000 INJECTION, SOLUTION INTRAVENOUS
Qty: 0 | Refills: 0 | Status: DISCONTINUED | OUTPATIENT
Start: 2018-07-08 | End: 2018-07-08

## 2018-07-08 RX ORDER — OXYTOCIN 10 UNIT/ML
333.33 VIAL (ML) INJECTION
Qty: 20 | Refills: 0 | Status: DISCONTINUED | OUTPATIENT
Start: 2018-07-08 | End: 2018-07-08

## 2018-07-08 RX ORDER — GLYCERIN ADULT
1 SUPPOSITORY, RECTAL RECTAL AT BEDTIME
Qty: 0 | Refills: 0 | Status: DISCONTINUED | OUTPATIENT
Start: 2018-07-08 | End: 2018-07-10

## 2018-07-08 RX ORDER — AMPICILLIN TRIHYDRATE 250 MG
2 CAPSULE ORAL EVERY 6 HOURS
Qty: 0 | Refills: 0 | Status: DISCONTINUED | OUTPATIENT
Start: 2018-07-08 | End: 2018-07-08

## 2018-07-08 RX ORDER — IBUPROFEN 200 MG
600 TABLET ORAL EVERY 6 HOURS
Qty: 0 | Refills: 0 | Status: DISCONTINUED | OUTPATIENT
Start: 2018-07-08 | End: 2018-07-10

## 2018-07-08 RX ORDER — AMPICILLIN TRIHYDRATE 250 MG
2 CAPSULE ORAL ONCE
Qty: 0 | Refills: 0 | Status: DISCONTINUED | OUTPATIENT
Start: 2018-07-08 | End: 2018-07-08

## 2018-07-08 RX ORDER — LANOLIN
1 OINTMENT (GRAM) TOPICAL EVERY 6 HOURS
Qty: 0 | Refills: 0 | Status: DISCONTINUED | OUTPATIENT
Start: 2018-07-08 | End: 2018-07-10

## 2018-07-08 RX ORDER — DIPHENHYDRAMINE HCL 50 MG
25 CAPSULE ORAL EVERY 6 HOURS
Qty: 0 | Refills: 0 | Status: DISCONTINUED | OUTPATIENT
Start: 2018-07-08 | End: 2018-07-10

## 2018-07-08 RX ORDER — AER TRAVELER 0.5 G/1
1 SOLUTION RECTAL; TOPICAL EVERY 4 HOURS
Qty: 0 | Refills: 0 | Status: DISCONTINUED | OUTPATIENT
Start: 2018-07-08 | End: 2018-07-10

## 2018-07-08 RX ORDER — DIBUCAINE 1 %
1 OINTMENT (GRAM) RECTAL EVERY 4 HOURS
Qty: 0 | Refills: 0 | Status: DISCONTINUED | OUTPATIENT
Start: 2018-07-08 | End: 2018-07-10

## 2018-07-08 RX ORDER — DOCUSATE SODIUM 100 MG
100 CAPSULE ORAL
Qty: 0 | Refills: 0 | Status: DISCONTINUED | OUTPATIENT
Start: 2018-07-08 | End: 2018-07-10

## 2018-07-08 RX ORDER — TETANUS TOXOID, REDUCED DIPHTHERIA TOXOID AND ACELLULAR PERTUSSIS VACCINE, ADSORBED 5; 2.5; 8; 8; 2.5 [IU]/.5ML; [IU]/.5ML; UG/.5ML; UG/.5ML; UG/.5ML
0.5 SUSPENSION INTRAMUSCULAR ONCE
Qty: 0 | Refills: 0 | Status: COMPLETED | OUTPATIENT
Start: 2018-07-08

## 2018-07-08 RX ORDER — OXYTOCIN 10 UNIT/ML
333.33 VIAL (ML) INJECTION
Qty: 20 | Refills: 0 | Status: COMPLETED | OUTPATIENT
Start: 2018-07-08

## 2018-07-08 RX ORDER — AMPICILLIN TRIHYDRATE 250 MG
CAPSULE ORAL
Qty: 0 | Refills: 0 | Status: DISCONTINUED | OUTPATIENT
Start: 2018-07-08 | End: 2018-07-08

## 2018-07-08 RX ORDER — SODIUM CHLORIDE 9 MG/ML
1000 INJECTION, SOLUTION INTRAVENOUS ONCE
Qty: 0 | Refills: 0 | Status: COMPLETED | OUTPATIENT
Start: 2018-07-08 | End: 2018-07-08

## 2018-07-08 RX ORDER — ACETAMINOPHEN 500 MG
650 TABLET ORAL EVERY 6 HOURS
Qty: 0 | Refills: 0 | Status: DISCONTINUED | OUTPATIENT
Start: 2018-07-08 | End: 2018-07-10

## 2018-07-08 RX ORDER — MAGNESIUM HYDROXIDE 400 MG/1
30 TABLET, CHEWABLE ORAL
Qty: 0 | Refills: 0 | Status: DISCONTINUED | OUTPATIENT
Start: 2018-07-08 | End: 2018-07-10

## 2018-07-08 RX ADMIN — Medication 600 MILLIGRAM(S): at 16:12

## 2018-07-08 RX ADMIN — Medication 100 MILLIGRAM(S): at 17:15

## 2018-07-08 RX ADMIN — Medication 12 MILLIGRAM(S): at 11:10

## 2018-07-08 RX ADMIN — Medication 300 GRAM(S): at 11:05

## 2018-07-08 RX ADMIN — Medication 600 MILLIGRAM(S): at 16:49

## 2018-07-08 RX ADMIN — Medication 50 GM/HR: at 13:53

## 2018-07-08 RX ADMIN — SODIUM CHLORIDE 2000 MILLILITER(S): 9 INJECTION, SOLUTION INTRAVENOUS at 10:15

## 2018-07-08 NOTE — DISCHARGE NOTE OB - MATERIALS PROVIDED
Breastfeeding Guide and Packet/Prescription for Breast Pump/Shaken Baby Prevention Handout/Birth Certificate Instructions/Breastfeeding Log/Back To Sleep Handout/[facility] [place] Screening Program/[place]  Immunization Record/Vaccinations/Tdap Vaccination (VIS Pub Date: 2012)/Breastfeeding Mother’s Support Group Information/Guide to Postpartum Care/[facility] Hearing Screen Program/Discharge Medication Information for Patients and Families Pocket Guide

## 2018-07-08 NOTE — DISCHARGE NOTE OB - MEDICATION SUMMARY - MEDICATIONS TO TAKE
I will START or STAY ON the medications listed below when I get home from the hospital:    ibuprofen 600 mg oral tablet  -- 1 tab(s) by mouth every 6 hours, As needed, Moderate Pain  -- Indication: For  (normal spontaneous vaginal delivery)    ferrous sulfate 325 mg (65 mg elemental iron) oral tablet  -- 1 tab(s) by mouth 3 times a day   -- Check with your doctor before becoming pregnant.  Do not chew, break, or crush.  May discolor urine or feces.    -- Indication: For Anemia    Prenatal Multivitamins with Folic Acid 1 mg oral tablet  -- 1 tab(s) by mouth once a day  -- Indication: For  (normal spontaneous vaginal delivery)    docusate sodium 100 mg oral capsule  -- 1 cap(s) by mouth 2 times a day, As needed, Stool Softening  -- Indication: For Constipation

## 2018-07-08 NOTE — DISCHARGE NOTE OB - CARE PLAN
Principal Discharge DX:	 (normal spontaneous vaginal delivery)  Goal:	Fast Recovery  Assessment and plan of treatment:	21 yo now  had a  at approximately 35 wks. Labor course was without any complications and delivery was uncomplicated. Patient did well in recovery and was transferred to post partum floor.     Encourage ambulation & hydration. Encourage mother/baby interaction. Principal Discharge DX:	 (normal spontaneous vaginal delivery)  Goal:	Fast Recovery  Assessment and plan of treatment:	23 yo now  had a  at approximately 35 wks. Labor course was without any complications and delivery was uncomplicated. Patient did well in recovery and was transferred to post partum floor.     Encourage ambulation & hydration. Encourage mother/baby interaction. To follow up at [facility] in 4 weeks for post partum check up. Diet and activity as tolerated.  Take medication as indicated  Patient should contact doctor earlier should she develop persistent/increased vaginal bleeding or fever.

## 2018-07-08 NOTE — DISCHARGE NOTE OB - HOSPITAL COURSE
21 yo now  had a  at approximately 35 wks. Labor course was without any complications and delivery was uncomplicated. . Patient did well in recovery and was transferred to post partum floor. Patient had a drop in hemoglobin count from 8.6 at admission to 7.5 today. Patient was started on Iron 3 times a day. Patient to follow up at [facility] in 3-4 weeks for postpartum check up. Patient is in medically optimized condition to be discharged home.

## 2018-07-08 NOTE — DISCHARGE NOTE OB - PLAN OF CARE
Fast Recovery 23 yo now  had a  at approximately 35 wks. Labor course was without any complications and delivery was uncomplicated. Patient did well in recovery and was transferred to post partum floor.     Encourage ambulation & hydration. Encourage mother/baby interaction. 23 yo now  had a  at approximately 35 wks. Labor course was without any complications and delivery was uncomplicated. Patient did well in recovery and was transferred to post partum floor.     Encourage ambulation & hydration. Encourage mother/baby interaction. To follow up at [facility] in 4 weeks for post partum check up. Diet and activity as tolerated.  Take medication as indicated  Patient should contact doctor earlier should she develop persistent/increased vaginal bleeding or fever.

## 2018-07-08 NOTE — ED PROVIDER NOTE - OBJECTIVE STATEMENT
21 y/o F  at 27 weeks gestations with abdominal cramping concerning for contractions and  labor.  pt states there is thick discharge and lower abdominal cramping every few minutes since the morning.  Pt is due to deliverer at [facility].

## 2018-07-08 NOTE — DISCHARGE NOTE OB - PROVIDER TOKENS
FREE:[LAST:[[facility]],FIRST:[Kathie],PHONE:[phone]],FAX:[(   )    -],ADDRESS:[[facility] [address] , [place] [address]]]

## 2018-07-08 NOTE — DISCHARGE NOTE OB - PATIENT PORTAL LINK FT
You can access the [facility] Patient Portal, offered by [facility], by registering with the following website: [email or web address]

## 2018-07-08 NOTE — ED ADULT NURSE NOTE - OBJECTIVE STATEMENT
Pt is a 26YF  received ambulatory A&Ox4 27 weeks pregnant complaining of having contractions x 1 day now. Pt report the contraction were  20 minutes apart. denies any vaginal bleeding,  Pt denies any headache, dizziness chest pain SOB nausea or vomiting. respiration even unlabored lung field clear bilaterally. will monitor support and safety maintained. Pt is a 26YF  received ambulatory A&Ox4 27 weeks pregnant complaining of having contractions x 1 day now. Pt report the contraction were  20 minutes apart. denies any vaginal bleeding, no dilating noted  Pt denies any headache, dizziness chest pain SOB nausea or vomiting. respiration even unlabored lung field clear bilaterally. will monitor support and safety maintained.

## 2018-07-09 LAB
ANISOCYTOSIS BLD QL: SLIGHT — SIGNIFICANT CHANGE UP
HBV SURFACE AB SER-ACNC: REACTIVE
HCT VFR BLD CALC: 25.1 % — LOW (ref 37–47)
HGB BLD-MCNC: 7.5 G/DL — LOW (ref 12–16)
HYPOCHROMIA BLD QL: SLIGHT — SIGNIFICANT CHANGE UP
LYMPHOCYTES # BLD AUTO: 1.4 K/UL — SIGNIFICANT CHANGE UP (ref 1–4.8)
LYMPHOCYTES # BLD AUTO: 8 % — LOW (ref 20–55)
MCHC RBC-ENTMCNC: 21.8 PG — LOW (ref 27–31)
MCHC RBC-ENTMCNC: 29.9 G/DL — LOW (ref 32–36)
MCV RBC AUTO: 73 FL — LOW (ref 81–99)
METAMYELOCYTES # FLD: 1 % — HIGH (ref 0–0)
MICROCYTES BLD QL: SLIGHT — SIGNIFICANT CHANGE UP
MONOCYTES # BLD AUTO: 1.6 K/UL — HIGH (ref 0–0.8)
MONOCYTES NFR BLD AUTO: 9 % — SIGNIFICANT CHANGE UP (ref 3–10)
MYELOCYTES NFR BLD: 1 % — HIGH (ref 0–0)
NEUTROPHILS # BLD AUTO: 14.3 K/UL — HIGH (ref 1.8–8)
NEUTROPHILS NFR BLD AUTO: 77 % — HIGH (ref 37–73)
NEUTS BAND # BLD: 4 % — SIGNIFICANT CHANGE UP (ref 0–8)
PLAT MORPH BLD: NORMAL — SIGNIFICANT CHANGE UP
PLATELET # BLD AUTO: 201 K/UL — SIGNIFICANT CHANGE UP (ref 150–400)
POIKILOCYTOSIS BLD QL AUTO: SLIGHT — SIGNIFICANT CHANGE UP
RBC # BLD: 3.44 M/UL — LOW (ref 4.4–5.2)
RBC # FLD: 17 % — HIGH (ref 11–15.6)
RBC BLD AUTO: ABNORMAL
T PALLIDUM AB TITR SER: NEGATIVE — SIGNIFICANT CHANGE UP
WBC # BLD: 17.3 K/UL — HIGH (ref 4.8–10.8)
WBC # FLD AUTO: 17.3 K/UL — HIGH (ref 4.8–10.8)

## 2018-07-09 RX ORDER — FERROUS SULFATE 325(65) MG
1 TABLET ORAL
Qty: 90 | Refills: 0 | OUTPATIENT
Start: 2018-07-09 | End: 2018-08-07

## 2018-07-09 RX ORDER — IBUPROFEN 200 MG
1 TABLET ORAL
Qty: 120 | Refills: 0 | OUTPATIENT
Start: 2018-07-09 | End: 2018-08-07

## 2018-07-09 RX ORDER — DOCUSATE SODIUM 100 MG
100 CAPSULE ORAL DAILY
Qty: 0 | Refills: 0 | Status: DISCONTINUED | OUTPATIENT
Start: 2018-07-09 | End: 2018-07-10

## 2018-07-09 RX ORDER — DOCUSATE SODIUM 100 MG
1 CAPSULE ORAL
Qty: 60 | Refills: 0 | OUTPATIENT
Start: 2018-07-09 | End: 2018-08-07

## 2018-07-09 RX ORDER — FERROUS SULFATE 325(65) MG
325 TABLET ORAL THREE TIMES A DAY
Qty: 0 | Refills: 0 | Status: DISCONTINUED | OUTPATIENT
Start: 2018-07-09 | End: 2018-07-10

## 2018-07-09 RX ORDER — TETANUS TOXOID, REDUCED DIPHTHERIA TOXOID AND ACELLULAR PERTUSSIS VACCINE, ADSORBED 5; 2.5; 8; 8; 2.5 [IU]/.5ML; [IU]/.5ML; UG/.5ML; UG/.5ML; UG/.5ML
0.5 SUSPENSION INTRAMUSCULAR ONCE
Qty: 0 | Refills: 0 | Status: COMPLETED | OUTPATIENT
Start: 2018-07-09 | End: 2018-07-09

## 2018-07-09 RX ADMIN — Medication 325 MILLIGRAM(S): at 12:29

## 2018-07-09 RX ADMIN — Medication 600 MILLIGRAM(S): at 01:00

## 2018-07-09 RX ADMIN — Medication 100 MILLIGRAM(S): at 19:00

## 2018-07-09 RX ADMIN — TETANUS TOXOID, REDUCED DIPHTHERIA TOXOID AND ACELLULAR PERTUSSIS VACCINE, ADSORBED 0.5 MILLILITER(S): 5; 2.5; 8; 8; 2.5 SUSPENSION INTRAMUSCULAR at 23:35

## 2018-07-09 RX ADMIN — Medication 100 MILLIGRAM(S): at 08:45

## 2018-07-09 RX ADMIN — Medication 100 MILLIGRAM(S): at 12:28

## 2018-07-09 RX ADMIN — Medication 325 MILLIGRAM(S): at 23:36

## 2018-07-09 RX ADMIN — Medication 1 TABLET(S): at 12:28

## 2018-07-09 RX ADMIN — Medication 600 MILLIGRAM(S): at 00:06

## 2018-07-09 NOTE — PROGRESS NOTE ADULT - ASSESSMENT
22 years old.  s/p  at 35 weeks 3 days gestation. Postpartum day # 1 without complications. Patient was a late transfer of care and no OB records were available. Placenta was noted to have about 25% abruption, sent to pathology.

## 2018-07-09 NOTE — PROGRESS NOTE ADULT - SUBJECTIVE AND OBJECTIVE BOX
Postpartum progress note.  22 years old.  s/p  at 35 weeks 3 days gestation. Postpartum day # 1    Patient seen and examined at bedside, no acute overnight events.    Pain is well controlled.  Patient is ambulating, tolerating PO intake of regular diet, voiding, passing flatus.   Breast feeding + Formula.   She reports mild vaginal bleeding.  Patient denies headache, nausea/vomiting, shortness of breath, fever, chills or calf pain.     Vital Signs: Vital Signs Last 24 Hrs  T(C): 37.1 (2018 20:36), Max: 37.1 (2018 12:35)  T(F): 98.8 (2018 20:36), Max: 98.8 (2018 12:35)  HR: 68 (2018 20:36) (67 - 84)  BP: 120/77 (2018 20:36) (114/56 - 138/69)  RR: 20 (2018 20:36) (14 - 20)  SpO2: 99% (2018 20:36) (99% - 99%)    Physical Examination:  General: NAD  Resp: CTAB, no crackles or wheezes  Cardio: RRR, normal S1/S2, no murmurs  Breast: no tenderness or engorgement  Abdomen: BS+, soft, non distended, minimally tender, fundus firm at level of umbilicus  Extremities: No cyanosis or edema. No calf tenderness, (-) Dennis's sign    Labs:                        7.9    18.3  )-----------( 174      ( 2018 20:17 )             26.0       Medication:  MEDICATIONS  (STANDING):  diphtheria/tetanus/pertussis (acellular) Vaccine (ADAcel) 0.5 milliLiter(s) IntraMuscular once  lactated ringers Bolus 1000 milliLiter(s) IV Bolus once  magnesium sulfate Infusion 2 Gm/Hr (50 mL/Hr) IV Continuous <Continuous>  nitrofurantoin (MACROBID) 100 milliGRAM(s) Oral two times a day with meals  oxytocin Infusion 41.667 milliUNIT(s)/Min (125 mL/Hr) IV Continuous <Continuous>  prenatal multivitamin 1 Tablet(s) Oral daily  sodium chloride 0.9% lock flush 3 milliLiter(s) IV Push every 8 hours    MEDICATIONS  (PRN):  acetaminophen   Tablet 650 milliGRAM(s) Oral every 6 hours PRN For Temp greater than 38.5 C (101.3 F)  acetaminophen   Tablet. 650 milliGRAM(s) Oral every 6 hours PRN Mild Pain  dibucaine 1% Ointment 1 Application(s) Topical every 4 hours PRN Perineal Discomfort  diphenhydrAMINE   Capsule 25 milliGRAM(s) Oral every 6 hours PRN Itching  docusate sodium 100 milliGRAM(s) Oral two times a day PRN Stool Softening  glycerin Suppository - Adult 1 Suppository(s) Rectal at bedtime PRN Constipation  hydrocortisone 1% Cream 1 Application(s) Topical every 4 hours PRN Moderate to Severe Perineal Pain  ibuprofen  Tablet 600 milliGRAM(s) Oral every 6 hours PRN Moderate Pain  lanolin Ointment 1 Application(s) Topical every 6 hours PRN Sore Nipples  magnesium hydroxide Suspension 30 milliLiter(s) Oral two times a day PRN Constipation  oxyCODONE    5 mG/acetaminophen 325 mG 2 Tablet(s) Oral every 6 hours PRN Severe Pain  pramoxine 1%/zinc 5% Cream 1 Application(s) Topical every 4 hours PRN Moderate to Severe Perineal Pain  simethicone 80 milliGRAM(s) Chew every 6 hours PRN Gas  witch hazel Pads 1 Application(s) Topical every 4 hours PRN Perineal Discomfort

## 2018-07-09 NOTE — PROGRESS NOTE ADULT - PROBLEM SELECTOR PLAN 1
Routine post partum care  Pain control prn  Encouraged ambulation  Encouraged breastfeeding  CBC currently stable, morning CBC pending

## 2018-07-10 VITALS
RESPIRATION RATE: 18 BRPM | TEMPERATURE: 98 F | DIASTOLIC BLOOD PRESSURE: 83 MMHG | SYSTOLIC BLOOD PRESSURE: 130 MMHG | HEART RATE: 69 BPM

## 2018-07-10 LAB
-  AMIKACIN: SIGNIFICANT CHANGE UP
-  AMPICILLIN/SULBACTAM: SIGNIFICANT CHANGE UP
-  AMPICILLIN: SIGNIFICANT CHANGE UP
-  AZTREONAM: SIGNIFICANT CHANGE UP
-  CEFAZOLIN: SIGNIFICANT CHANGE UP
-  CEFEPIME: SIGNIFICANT CHANGE UP
-  CEFOXITIN: SIGNIFICANT CHANGE UP
-  CEFTRIAXONE: SIGNIFICANT CHANGE UP
-  CIPROFLOXACIN: SIGNIFICANT CHANGE UP
-  ERTAPENEM: SIGNIFICANT CHANGE UP
-  GENTAMICIN: SIGNIFICANT CHANGE UP
-  IMIPENEM: SIGNIFICANT CHANGE UP
-  LEVOFLOXACIN: SIGNIFICANT CHANGE UP
-  MEROPENEM: SIGNIFICANT CHANGE UP
-  PIPERACILLIN/TAZOBACTAM: SIGNIFICANT CHANGE UP
-  TOBRAMYCIN: SIGNIFICANT CHANGE UP
-  TRIMETHOPRIM/SULFAMETHOXAZOLE: SIGNIFICANT CHANGE UP
C TRACH RRNA SPEC QL NAA+PROBE: SIGNIFICANT CHANGE UP
HBV SURFACE AG SERPL QL IA: SIGNIFICANT CHANGE UP
METHOD TYPE: SIGNIFICANT CHANGE UP
MEV IGG SER-ACNC: 151 AU/ML — SIGNIFICANT CHANGE UP
MEV IGG+IGM SER-IMP: POSITIVE — SIGNIFICANT CHANGE UP
N GONORRHOEA RRNA SPEC QL NAA+PROBE: SIGNIFICANT CHANGE UP
RUBV IGG SER-ACNC: 1.5 INDEX — SIGNIFICANT CHANGE UP
RUBV IGG SER-ACNC: 1.8 INDEX — SIGNIFICANT CHANGE UP
RUBV IGG SER-IMP: POSITIVE — SIGNIFICANT CHANGE UP
RUBV IGG SER-IMP: POSITIVE — SIGNIFICANT CHANGE UP
SPECIMEN SOURCE: SIGNIFICANT CHANGE UP

## 2018-07-10 PROCEDURE — 36415 COLL VENOUS BLD VENIPUNCTURE: CPT

## 2018-07-10 PROCEDURE — 86762 RUBELLA ANTIBODY: CPT

## 2018-07-10 PROCEDURE — 82803 BLOOD GASES ANY COMBINATION: CPT

## 2018-07-10 PROCEDURE — 76817 TRANSVAGINAL US OBSTETRIC: CPT

## 2018-07-10 PROCEDURE — 87491 CHLMYD TRACH DNA AMP PROBE: CPT

## 2018-07-10 PROCEDURE — 86780 TREPONEMA PALLIDUM: CPT

## 2018-07-10 PROCEDURE — 85027 COMPLETE CBC AUTOMATED: CPT

## 2018-07-10 PROCEDURE — 85730 THROMBOPLASTIN TIME PARTIAL: CPT

## 2018-07-10 PROCEDURE — 90715 TDAP VACCINE 7 YRS/> IM: CPT

## 2018-07-10 PROCEDURE — 86923 COMPATIBILITY TEST ELECTRIC: CPT

## 2018-07-10 PROCEDURE — 88307 TISSUE EXAM BY PATHOLOGIST: CPT

## 2018-07-10 PROCEDURE — 86850 RBC ANTIBODY SCREEN: CPT

## 2018-07-10 PROCEDURE — 86706 HEP B SURFACE ANTIBODY: CPT

## 2018-07-10 PROCEDURE — 86901 BLOOD TYPING SEROLOGIC RH(D): CPT

## 2018-07-10 PROCEDURE — 76815 OB US LIMITED FETUS(S): CPT

## 2018-07-10 PROCEDURE — 85610 PROTHROMBIN TIME: CPT

## 2018-07-10 PROCEDURE — 87186 SC STD MICRODIL/AGAR DIL: CPT

## 2018-07-10 PROCEDURE — 86703 HIV-1/HIV-2 1 RESULT ANTBDY: CPT

## 2018-07-10 PROCEDURE — 86900 BLOOD TYPING SEROLOGIC ABO: CPT

## 2018-07-10 PROCEDURE — 86735 MUMPS ANTIBODY: CPT

## 2018-07-10 PROCEDURE — 87340 HEPATITIS B SURFACE AG IA: CPT

## 2018-07-10 PROCEDURE — 83735 ASSAY OF MAGNESIUM: CPT

## 2018-07-10 PROCEDURE — 87070 CULTURE OTHR SPECIMN AEROBIC: CPT

## 2018-07-10 PROCEDURE — 87591 N.GONORRHOEAE DNA AMP PROB: CPT

## 2018-07-10 PROCEDURE — 82731 ASSAY OF FETAL FIBRONECTIN: CPT

## 2018-07-10 PROCEDURE — 86765 RUBEOLA ANTIBODY: CPT

## 2018-07-10 PROCEDURE — 80307 DRUG TEST PRSMV CHEM ANLYZR: CPT

## 2018-07-10 PROCEDURE — 87800 DETECT AGNT MULT DNA DIREC: CPT

## 2018-07-10 RX ADMIN — Medication 100 MILLIGRAM(S): at 09:23

## 2018-07-10 RX ADMIN — Medication 1 TABLET(S): at 13:42

## 2018-07-10 RX ADMIN — Medication 100 MILLIGRAM(S): at 09:24

## 2018-07-10 RX ADMIN — Medication 325 MILLIGRAM(S): at 09:26

## 2018-07-10 RX ADMIN — Medication 325 MILLIGRAM(S): at 13:43

## 2018-07-10 NOTE — PROGRESS NOTE ADULT - ASSESSMENT
22 years old.  s/p  at 35 weeks 3 days gestation. Postpartum day # 2 without complications. Patient was a late transfer of care and no OB records were available. Placenta was noted to have about 25% abruption, sent to pathology.

## 2018-07-10 NOTE — PROGRESS NOTE ADULT - PROBLEM SELECTOR PLAN 1
Routine post partum care  Pain control prn  Encouraged ambulation  Encouraged breastfeeding  CBC shows hb= 7.5 postpartum   Pt is in tx w/ nitrofurantoin for UTI  plan to d/c home on PPD#2 if no complications

## 2018-07-10 NOTE — PROGRESS NOTE ADULT - SUBJECTIVE AND OBJECTIVE BOX
Postpartum progress note.  22 years old.  s/p  at 35 weeks 3 days gestation. Postpartum day # 2    Patient seen and examined at bedside, no acute overnight events.    Pain is well controlled.  Patient is ambulating, tolerating PO intake of regular diet, voiding, passing flatus and positive BM   Breast feeding + Formula.   She reports mild vaginal bleeding.  Patient denies headache, nausea/vomiting, shortness of breath, fever, chills or calf pain.     Vital Signs Last 24 Hrs  T(C): 36.9 (10 Jul 2018 04:00), Max: 36.9 (10 Jul 2018 04:00)  T(F): 98.4 (10 Jul 2018 04:00), Max: 98.4 (10 Jul 2018 04:00)  HR: 72 (10 Jul 2018 04:00) (57 - 72)  BP: 105/64 (10 Jul 2018 04:00) (105/64 - 114/75)  RR: 18 (10 Jul 2018 04:00) (18 - 18)  SpO2: 100% (10 Jul 2018 04:00) (100% - 100%)    Physical Examination:  General: NAD  Resp: CTAB, no crackles or wheezes  Cardio: RRR, normal S1/S2, no murmurs  Breast: no tenderness or engorgement  Abdomen: BS+, soft, non distended, minimally tender, fundus firm at level of umbilicus  Extremities: No cyanosis or edema. No calf tenderness, (-) Dennis's sign    Labs:                        7.9    18.3  )-----------( 174      ( 2018 20:17 )             26.0                           7.5    17.3  )-----------( 201      ( 2018 06:29 )             25.1       Medication:  MEDICATIONS  (STANDING):  diphtheria/tetanus/pertussis (acellular) Vaccine (ADAcel) 0.5 milliLiter(s) IntraMuscular once  lactated ringers Bolus 1000 milliLiter(s) IV Bolus once  magnesium sulfate Infusion 2 Gm/Hr (50 mL/Hr) IV Continuous <Continuous>  nitrofurantoin (MACROBID) 100 milliGRAM(s) Oral two times a day with meals  oxytocin Infusion 41.667 milliUNIT(s)/Min (125 mL/Hr) IV Continuous <Continuous>  prenatal multivitamin 1 Tablet(s) Oral daily  sodium chloride 0.9% lock flush 3 milliLiter(s) IV Push every 8 hours    MEDICATIONS  (PRN):  acetaminophen   Tablet 650 milliGRAM(s) Oral every 6 hours PRN For Temp greater than 38.5 C (101.3 F)  acetaminophen   Tablet. 650 milliGRAM(s) Oral every 6 hours PRN Mild Pain  dibucaine 1% Ointment 1 Application(s) Topical every 4 hours PRN Perineal Discomfort  diphenhydrAMINE   Capsule 25 milliGRAM(s) Oral every 6 hours PRN Itching  docusate sodium 100 milliGRAM(s) Oral two times a day PRN Stool Softening  glycerin Suppository - Adult 1 Suppository(s) Rectal at bedtime PRN Constipation  hydrocortisone 1% Cream 1 Application(s) Topical every 4 hours PRN Moderate to Severe Perineal Pain  ibuprofen  Tablet 600 milliGRAM(s) Oral every 6 hours PRN Moderate Pain  lanolin Ointment 1 Application(s) Topical every 6 hours PRN Sore Nipples  magnesium hydroxide Suspension 30 milliLiter(s) Oral two times a day PRN Constipation  oxyCODONE    5 mG/acetaminophen 325 mG 2 Tablet(s) Oral every 6 hours PRN Severe Pain  pramoxine 1%/zinc 5% Cream 1 Application(s) Topical every 4 hours PRN Moderate to Severe Perineal Pain  simethicone 80 milliGRAM(s) Chew every 6 hours PRN Gas  witch hazel Pads 1 Application(s) Topical every 4 hours PRN Perineal Discomfort

## 2018-07-11 LAB
CULTURE RESULTS: SIGNIFICANT CHANGE UP
MEV IGM SER-ACNC: <20 AU/ML — SIGNIFICANT CHANGE UP (ref 0–19.9)
MUV IGM FLD QL IA: <0.8 AU — SIGNIFICANT CHANGE UP (ref 0–0.79)
ORGANISM # SPEC MICROSCOPIC CNT: SIGNIFICANT CHANGE UP
ORGANISM # SPEC MICROSCOPIC CNT: SIGNIFICANT CHANGE UP
SPECIMEN SOURCE: SIGNIFICANT CHANGE UP

## 2019-06-17 NOTE — PATIENT PROFILE OB - CAREGIVER
Procedures     ULTRASOUND:   Ultrasound performed in the usual fashion, showing viable boyle intrauterine pregnancy, crown-rump length = 12.7   mm with flicker,   consistent with   7,3      and EDC 1/31/20  LMP 4/19/19 at 8.4 weeks with EDC 1/24/20  No free fluid in cul-de-sac or adnexal pathology.  ++ subchorionic hemorrhage    
No

## 2020-06-18 NOTE — PATIENT PROFILE OB - BREAST MILK PROVIDES COLOSTRUM THAT IS HIGH IN PROTEIN
Statement Selected Cartilage Graft Text: The defect edges were debeveled with a #15 scalpel blade.  Given the location of the defect, shape of the defect, the fact the defect involved a full thickness cartilage defect a cartilage graft was deemed most appropriate.  An appropriate donor site was identified, cleansed, and anesthetized. The cartilage graft was then harvested and transferred to the recipient site, oriented appropriately and then sutured into place.  The secondary defect was then repaired using a primary closure.

## 2022-02-03 NOTE — ED ADULT TRIAGE NOTE - SPO2 (%)
Mom given discharge instructions and follow up information given. Mom denies  further questions. Patient resting comfortably at this time. Patient discharged home.   
Per pt, noticed cyst on Friday, came in on Sunday and was told that the abscess was insufficient to drain. Pt states it hurts to sit and walk. Pt complains of itchiness to the area. Was advised by HCP to use warm compressions to relieve the pain. Pt took ibuprofen @ 0900 today.   
100

## 2022-03-23 NOTE — PATIENT PROFILE OB - PRO INTERPRETER NEED 2
Addendum by Vascular Fellow Miguel Gonzales MD:    Please refer to PA Reyes' note for further details on patient's HPI.   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  She came to ED today c/o worsening left arm swelling.  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 Guan Addendum by Vascular Fellow Miguel Gonzales MD:    Please refer to PA Reyes' note for further details on patient's HPI.   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  She came to ED today c/o worsening left arm swelling.  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 Guan English

## 2022-10-21 NOTE — ED PROVIDER NOTE - CPE EDP SKIN NORM
Patient sent a [employer] message requesting a refill on Gabapentin. Can you send to pharmacy? Sig has been adjusted to bid as you previously recommended. normal...
